# Patient Record
Sex: FEMALE | Race: WHITE | NOT HISPANIC OR LATINO | URBAN - METROPOLITAN AREA
[De-identification: names, ages, dates, MRNs, and addresses within clinical notes are randomized per-mention and may not be internally consistent; named-entity substitution may affect disease eponyms.]

---

## 2018-07-08 ENCOUNTER — EMERGENCY (EMERGENCY)
Facility: HOSPITAL | Age: 83
LOS: 0 days | Discharge: HOME | End: 2018-07-08
Attending: EMERGENCY MEDICINE
Payer: MEDICARE

## 2018-07-08 VITALS
HEART RATE: 68 BPM | DIASTOLIC BLOOD PRESSURE: 71 MMHG | OXYGEN SATURATION: 97 % | SYSTOLIC BLOOD PRESSURE: 126 MMHG | TEMPERATURE: 98 F | RESPIRATION RATE: 19 BRPM

## 2018-07-08 VITALS
HEART RATE: 66 BPM | OXYGEN SATURATION: 100 % | DIASTOLIC BLOOD PRESSURE: 109 MMHG | TEMPERATURE: 98 F | WEIGHT: 130.07 LBS | SYSTOLIC BLOOD PRESSURE: 187 MMHG | RESPIRATION RATE: 17 BRPM | HEIGHT: 65 IN

## 2018-07-08 DIAGNOSIS — M25.511 PAIN IN RIGHT SHOULDER: ICD-10-CM

## 2018-07-08 DIAGNOSIS — I10 ESSENTIAL (PRIMARY) HYPERTENSION: ICD-10-CM

## 2018-07-08 DIAGNOSIS — R51 HEADACHE: ICD-10-CM

## 2018-07-08 DIAGNOSIS — I48.91 UNSPECIFIED ATRIAL FIBRILLATION: ICD-10-CM

## 2018-07-08 DIAGNOSIS — M54.5 LOW BACK PAIN: ICD-10-CM
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
## 2018-07-08 DIAGNOSIS — R07.9 CHEST PAIN, UNSPECIFIED: ICD-10-CM
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
## 2018-07-08 LAB
ALBUMIN SERPL ELPH-MCNC: 2.9 G/DL — LOW (ref 3.3–5)
ALP SERPL-CCNC: 139 U/L — HIGH (ref 40–120)
ALT FLD-CCNC: 22 U/L — SIGNIFICANT CHANGE UP (ref 12–78)
ANION GAP SERPL CALC-SCNC: 9 MMOL/L — SIGNIFICANT CHANGE UP (ref 5–17)
APTT BLD: 26.4 SEC — LOW (ref 27.5–37.4)
AST SERPL-CCNC: 34 U/L — SIGNIFICANT CHANGE UP (ref 15–37)
BILIRUB SERPL-MCNC: 0.8 MG/DL — SIGNIFICANT CHANGE UP (ref 0.2–1.2)
BUN SERPL-MCNC: 26 MG/DL — HIGH (ref 7–23)
CALCIUM SERPL-MCNC: 8.7 MG/DL — SIGNIFICANT CHANGE UP (ref 8.5–10.1)
CHLORIDE SERPL-SCNC: 106 MMOL/L — SIGNIFICANT CHANGE UP (ref 96–108)
CO2 SERPL-SCNC: 26 MMOL/L — SIGNIFICANT CHANGE UP (ref 22–31)
CREAT SERPL-MCNC: 1.23 MG/DL — SIGNIFICANT CHANGE UP (ref 0.5–1.3)
GLUCOSE SERPL-MCNC: 114 MG/DL — HIGH (ref 70–99)
HCT VFR BLD CALC: 38.6 % — SIGNIFICANT CHANGE UP (ref 34.5–45)
HGB BLD-MCNC: 11.4 G/DL — LOW (ref 11.5–15.5)
INR BLD: 1.28 RATIO — HIGH (ref 0.88–1.16)
MCHC RBC-ENTMCNC: 23.5 PG — LOW (ref 27–34)
MCHC RBC-ENTMCNC: 29.5 GM/DL — LOW (ref 32–36)
MCV RBC AUTO: 79.6 FL — LOW (ref 80–100)
NRBC # BLD: 0 /100 WBCS — SIGNIFICANT CHANGE UP (ref 0–0)
PLATELET # BLD AUTO: 306 K/UL — SIGNIFICANT CHANGE UP (ref 150–400)
POTASSIUM SERPL-MCNC: 4.2 MMOL/L — SIGNIFICANT CHANGE UP (ref 3.5–5.3)
POTASSIUM SERPL-SCNC: 4.2 MMOL/L — SIGNIFICANT CHANGE UP (ref 3.5–5.3)
PROT SERPL-MCNC: 7.4 GM/DL — SIGNIFICANT CHANGE UP (ref 6–8.3)
PROTHROM AB SERPL-ACNC: 14 SEC — HIGH (ref 9.8–12.7)
RBC # BLD: 4.85 M/UL — SIGNIFICANT CHANGE UP (ref 3.8–5.2)
RBC # FLD: 16.9 % — HIGH (ref 10.3–14.5)
SODIUM SERPL-SCNC: 141 MMOL/L — SIGNIFICANT CHANGE UP (ref 135–145)
TROPONIN I SERPL-MCNC: 0.03 NG/ML — SIGNIFICANT CHANGE UP (ref 0.01–0.04)
WBC # BLD: 7.14 K/UL — SIGNIFICANT CHANGE UP (ref 3.8–10.5)
WBC # FLD AUTO: 7.14 K/UL — SIGNIFICANT CHANGE UP (ref 3.8–10.5)

## 2018-07-08 PROCEDURE — 71045 X-RAY EXAM CHEST 1 VIEW: CPT | Mod: 26

## 2018-07-08 PROCEDURE — 70450 CT HEAD/BRAIN W/O DYE: CPT | Mod: 26

## 2018-07-08 PROCEDURE — 99284 EMERGENCY DEPT VISIT MOD MDM: CPT

## 2018-07-08 PROCEDURE — 73030 X-RAY EXAM OF SHOULDER: CPT | Mod: 26,RT

## 2018-07-08 PROCEDURE — 93010 ELECTROCARDIOGRAM REPORT: CPT

## 2018-07-08 PROCEDURE — 72125 CT NECK SPINE W/O DYE: CPT | Mod: 26

## 2018-07-08 PROCEDURE — 72100 X-RAY EXAM L-S SPINE 2/3 VWS: CPT | Mod: 26

## 2018-07-08 PROCEDURE — 72170 X-RAY EXAM OF PELVIS: CPT | Mod: 26

## 2018-07-08 RX ORDER — ACETAMINOPHEN 500 MG
975 TABLET ORAL ONCE
Qty: 0 | Refills: 0 | Status: COMPLETED | OUTPATIENT
Start: 2018-07-08 | End: 2018-07-08

## 2018-07-08 RX ORDER — PROPRANOLOL HCL 160 MG
80 CAPSULE, EXTENDED RELEASE 24HR ORAL
Qty: 0 | Refills: 0 | COMMUNITY

## 2018-07-08 RX ORDER — PROPRANOLOL HCL 160 MG
0 CAPSULE, EXTENDED RELEASE 24HR ORAL
Qty: 0 | Refills: 0 | COMMUNITY

## 2018-07-08 RX ADMIN — Medication 975 MILLIGRAM(S): at 07:55

## 2018-07-08 RX ADMIN — Medication 0.2 MILLIGRAM(S): at 07:55

## 2018-07-08 NOTE — ED PROVIDER NOTE - PROGRESS NOTE DETAILS
Results reported to patient--grossly benign, CTs manuel, no evidence of acute trauma, labs WNL  Pt. reports feeling better after meds  pt. agrees to f/u with primary care outpt.  pt. understands to return to ED if symptoms worsen; will d/c

## 2018-07-08 NOTE — ED PROVIDER NOTE - PHYSICAL EXAMINATION
Vitals: HTN at 187/109, otherwise WNL  Gen: AAOx3, NAD, sitting comfortably in stretcher  Head: ncat, perrla, eomi b/l  Neck: supple, no lymphadenopathy, no midline deviation  Heart: rrr, no m/r/g  Lungs: CTA b/l, no rales/ronchi/wheezes  Abd: soft, nontender, non-distended, no rebound or guarding  Ext: no clubbing/cyanosis/edema  Neuro: sensation and muscle strength intact b/l, steady gait

## 2018-07-08 NOTE — ED PROVIDER NOTE - MEDICAL DECISION MAKING DETAILS
89 yo F with likely muscle strain, r/o fracture/brain bleed 2/2 advanced age  -tylenol for pain, CT brain/cervical, labs including coags, trop (shoulder pain--ACS equivalent), ekg, monitor, clonidine for pressure  -f/u results, reeval

## 2018-07-08 NOTE — ED PROVIDER NOTE - OBJECTIVE STATEMENT
89 yo F with headache, neck and back pain after unwitnessed fall.  Pt. was at home, was trying to use walker to ambulate, then tripped and fell down to the floor.  Pt. remembers the incident, but can't tell me for sure whether she hit her head or not.  She also has r shoulder pain, but she's moving the shoulder.  No other complaints.  Family member at beside her mental status was normal after the fall.  ROS: negative for fever, cough, headache, chest pain, shortness of breath, abd pain, nausea, vomiting, diarrhea, rash, paresthesia, and weakness--all other systems reviewed are negative.   PMH: HTN; Meds: See EMR; SH: Denies smoking/drinking/drug use 89 yo F with headache, neck and back pain after unwitnessed fall.  Pt. was at home, was trying to use walker to ambulate, then tripped and fell down to the floor.  Pt. remembers the incident, but can't tell me for sure whether she hit her head or not.  She also has r shoulder pain, but she's moving the shoulder.  No other complaints.  Family member at beside her mental status was normal after the fall.  ROS: negative for fever, cough, headache, chest pain, shortness of breath, abd pain, nausea, vomiting, diarrhea, rash, paresthesia, and weakness--all other systems reviewed are negative.   PMH: HTN, a-fib; Meds: See EMR; SH: Denies smoking/drinking/drug use

## 2018-07-08 NOTE — ED ADULT NURSE NOTE - OBJECTIVE STATEMENT
(2) probably inadequate patient received a/o x3 with c/o while making eggs in her kitchen this morning, she turned around and tripped over her Rolator accidentally. C/o pain to right shoulder, back and head pain. No skin breaks or swelling noted.

## 2018-07-21 ENCOUNTER — INPATIENT (INPATIENT)
Facility: HOSPITAL | Age: 83
LOS: 1 days | Discharge: HOSPICE | End: 2018-07-23
Attending: INTERNAL MEDICINE | Admitting: INTERNAL MEDICINE
Payer: MEDICARE

## 2018-07-21 VITALS
RESPIRATION RATE: 13 BRPM | HEIGHT: 64 IN | HEART RATE: 78 BPM | SYSTOLIC BLOOD PRESSURE: 195 MMHG | DIASTOLIC BLOOD PRESSURE: 115 MMHG | WEIGHT: 179.9 LBS | OXYGEN SATURATION: 99 % | TEMPERATURE: 98 F

## 2018-07-21 DIAGNOSIS — E78.00 PURE HYPERCHOLESTEROLEMIA, UNSPECIFIED: ICD-10-CM

## 2018-07-21 DIAGNOSIS — I63.9 CEREBRAL INFARCTION, UNSPECIFIED: ICD-10-CM

## 2018-07-21 DIAGNOSIS — I10 ESSENTIAL (PRIMARY) HYPERTENSION: ICD-10-CM

## 2018-07-21 LAB
ALBUMIN SERPL ELPH-MCNC: 2.7 G/DL — LOW (ref 3.3–5)
ALP SERPL-CCNC: 173 U/L — HIGH (ref 40–120)
ALT FLD-CCNC: 25 U/L — SIGNIFICANT CHANGE UP (ref 12–78)
ANION GAP SERPL CALC-SCNC: 14 MMOL/L — SIGNIFICANT CHANGE UP (ref 5–17)
APPEARANCE UR: ABNORMAL
APTT BLD: 26.8 SEC — LOW (ref 27.5–37.4)
AST SERPL-CCNC: 36 U/L — SIGNIFICANT CHANGE UP (ref 15–37)
BASOPHILS # BLD AUTO: 0.04 K/UL — SIGNIFICANT CHANGE UP (ref 0–0.2)
BASOPHILS NFR BLD AUTO: 0.4 % — SIGNIFICANT CHANGE UP (ref 0–2)
BILIRUB SERPL-MCNC: 0.9 MG/DL — SIGNIFICANT CHANGE UP (ref 0.2–1.2)
BILIRUB UR-MCNC: NEGATIVE — SIGNIFICANT CHANGE UP
BUN SERPL-MCNC: 29 MG/DL — HIGH (ref 7–23)
CALCIUM SERPL-MCNC: 8.8 MG/DL — SIGNIFICANT CHANGE UP (ref 8.5–10.1)
CHLORIDE SERPL-SCNC: 107 MMOL/L — SIGNIFICANT CHANGE UP (ref 96–108)
CK MB CFR SERPL CALC: 3 NG/ML — SIGNIFICANT CHANGE UP (ref 0.5–3.6)
CO2 SERPL-SCNC: 19 MMOL/L — LOW (ref 22–31)
COLOR SPEC: YELLOW — SIGNIFICANT CHANGE UP
CREAT SERPL-MCNC: 1.12 MG/DL — SIGNIFICANT CHANGE UP (ref 0.5–1.3)
DIFF PNL FLD: ABNORMAL
EOSINOPHIL # BLD AUTO: 0.02 K/UL — SIGNIFICANT CHANGE UP (ref 0–0.5)
EOSINOPHIL NFR BLD AUTO: 0.2 % — SIGNIFICANT CHANGE UP (ref 0–6)
GLUCOSE BLDC GLUCOMTR-MCNC: 112 MG/DL — HIGH (ref 70–99)
GLUCOSE SERPL-MCNC: 109 MG/DL — HIGH (ref 70–99)
GLUCOSE UR QL: NEGATIVE MG/DL — SIGNIFICANT CHANGE UP
HCT VFR BLD CALC: 39.6 % — SIGNIFICANT CHANGE UP (ref 34.5–45)
HGB BLD-MCNC: 11.8 G/DL — SIGNIFICANT CHANGE UP (ref 11.5–15.5)
IMM GRANULOCYTES NFR BLD AUTO: 0.4 % — SIGNIFICANT CHANGE UP (ref 0–1.5)
INR BLD: 1.28 RATIO — HIGH (ref 0.88–1.16)
KETONES UR-MCNC: NEGATIVE — SIGNIFICANT CHANGE UP
LACTATE SERPL-SCNC: 1.8 MMOL/L — SIGNIFICANT CHANGE UP (ref 0.7–2)
LEUKOCYTE ESTERASE UR-ACNC: ABNORMAL
LYMPHOCYTES # BLD AUTO: 1.26 K/UL — SIGNIFICANT CHANGE UP (ref 1–3.3)
LYMPHOCYTES # BLD AUTO: 11.7 % — LOW (ref 13–44)
MCHC RBC-ENTMCNC: 22.8 PG — LOW (ref 27–34)
MCHC RBC-ENTMCNC: 29.8 GM/DL — LOW (ref 32–36)
MCV RBC AUTO: 76.6 FL — LOW (ref 80–100)
MONOCYTES # BLD AUTO: 1.08 K/UL — HIGH (ref 0–0.9)
MONOCYTES NFR BLD AUTO: 10 % — SIGNIFICANT CHANGE UP (ref 2–14)
NEUTROPHILS # BLD AUTO: 8.34 K/UL — HIGH (ref 1.8–7.4)
NEUTROPHILS NFR BLD AUTO: 77.3 % — HIGH (ref 43–77)
NITRITE UR-MCNC: POSITIVE
PH UR: 5 — SIGNIFICANT CHANGE UP (ref 5–8)
PLATELET # BLD AUTO: 304 K/UL — SIGNIFICANT CHANGE UP (ref 150–400)
POTASSIUM SERPL-MCNC: 4.1 MMOL/L — SIGNIFICANT CHANGE UP (ref 3.5–5.3)
POTASSIUM SERPL-SCNC: 4.1 MMOL/L — SIGNIFICANT CHANGE UP (ref 3.5–5.3)
PROT SERPL-MCNC: 7.3 GM/DL — SIGNIFICANT CHANGE UP (ref 6–8.3)
PROT UR-MCNC: 100 MG/DL
PROTHROM AB SERPL-ACNC: 14 SEC — HIGH (ref 9.8–12.7)
RBC # BLD: 5.17 M/UL — SIGNIFICANT CHANGE UP (ref 3.8–5.2)
RBC # FLD: 18.6 % — HIGH (ref 10.3–14.5)
SODIUM SERPL-SCNC: 140 MMOL/L — SIGNIFICANT CHANGE UP (ref 135–145)
SP GR SPEC: 1.01 — SIGNIFICANT CHANGE UP (ref 1.01–1.02)
TROPONIN I SERPL-MCNC: 1.36 NG/ML — HIGH (ref 0.01–0.04)
UROBILINOGEN FLD QL: NEGATIVE MG/DL — SIGNIFICANT CHANGE UP
WBC # BLD: 10.78 K/UL — HIGH (ref 3.8–10.5)
WBC # FLD AUTO: 10.78 K/UL — HIGH (ref 3.8–10.5)

## 2018-07-21 PROCEDURE — 93010 ELECTROCARDIOGRAM REPORT: CPT

## 2018-07-21 PROCEDURE — 99291 CRITICAL CARE FIRST HOUR: CPT

## 2018-07-21 PROCEDURE — 70450 CT HEAD/BRAIN W/O DYE: CPT | Mod: 26

## 2018-07-21 PROCEDURE — 71045 X-RAY EXAM CHEST 1 VIEW: CPT | Mod: 26

## 2018-07-21 RX ORDER — VANCOMYCIN HCL 1 G
1000 VIAL (EA) INTRAVENOUS ONCE
Qty: 0 | Refills: 0 | Status: DISCONTINUED | OUTPATIENT
Start: 2018-07-21 | End: 2018-07-21

## 2018-07-21 RX ORDER — PROPRANOLOL HCL 160 MG
80 CAPSULE, EXTENDED RELEASE 24HR ORAL
Qty: 0 | Refills: 0 | COMMUNITY

## 2018-07-21 RX ORDER — NYSTATIN CREAM 100000 [USP'U]/G
1 CREAM TOPICAL
Qty: 0 | Refills: 0 | Status: DISCONTINUED | OUTPATIENT
Start: 2018-07-21 | End: 2018-07-23

## 2018-07-21 RX ORDER — LABETALOL HCL 100 MG
10 TABLET ORAL ONCE
Qty: 0 | Refills: 0 | Status: COMPLETED | OUTPATIENT
Start: 2018-07-21 | End: 2018-07-21

## 2018-07-21 RX ORDER — ROSUVASTATIN CALCIUM 5 MG/1
1 TABLET ORAL
Qty: 0 | Refills: 0 | COMMUNITY

## 2018-07-21 RX ORDER — SODIUM CHLORIDE 9 MG/ML
1000 INJECTION, SOLUTION INTRAVENOUS
Qty: 0 | Refills: 0 | Status: DISCONTINUED | OUTPATIENT
Start: 2018-07-21 | End: 2018-07-23

## 2018-07-21 RX ORDER — ENOXAPARIN SODIUM 100 MG/ML
40 INJECTION SUBCUTANEOUS DAILY
Qty: 0 | Refills: 0 | Status: DISCONTINUED | OUTPATIENT
Start: 2018-07-21 | End: 2018-07-23

## 2018-07-21 RX ORDER — PIPERACILLIN AND TAZOBACTAM 4; .5 G/20ML; G/20ML
3.38 INJECTION, POWDER, LYOPHILIZED, FOR SOLUTION INTRAVENOUS ONCE
Qty: 0 | Refills: 0 | Status: COMPLETED | OUTPATIENT
Start: 2018-07-21 | End: 2018-07-21

## 2018-07-21 RX ORDER — MORPHINE SULFATE 50 MG/1
2 CAPSULE, EXTENDED RELEASE ORAL EVERY 6 HOURS
Qty: 0 | Refills: 0 | Status: DISCONTINUED | OUTPATIENT
Start: 2018-07-21 | End: 2018-07-23

## 2018-07-21 RX ORDER — ASPIRIN/CALCIUM CARB/MAGNESIUM 324 MG
1 TABLET ORAL
Qty: 0 | Refills: 0 | COMMUNITY

## 2018-07-21 RX ORDER — PIPERACILLIN AND TAZOBACTAM 4; .5 G/20ML; G/20ML
4.5 INJECTION, POWDER, LYOPHILIZED, FOR SOLUTION INTRAVENOUS ONCE
Qty: 0 | Refills: 0 | Status: DISCONTINUED | OUTPATIENT
Start: 2018-07-21 | End: 2018-07-21

## 2018-07-21 RX ORDER — FUROSEMIDE 40 MG
0 TABLET ORAL
Qty: 0 | Refills: 0 | COMMUNITY

## 2018-07-21 RX ADMIN — Medication 1 PATCH: at 17:04

## 2018-07-21 RX ADMIN — PIPERACILLIN AND TAZOBACTAM 200 GRAM(S): 4; .5 INJECTION, POWDER, LYOPHILIZED, FOR SOLUTION INTRAVENOUS at 10:43

## 2018-07-21 RX ADMIN — SODIUM CHLORIDE 75 MILLILITER(S): 9 INJECTION, SOLUTION INTRAVENOUS at 17:05

## 2018-07-21 RX ADMIN — ENOXAPARIN SODIUM 40 MILLIGRAM(S): 100 INJECTION SUBCUTANEOUS at 17:05

## 2018-07-21 RX ADMIN — Medication 10 MILLIGRAM(S): at 10:21

## 2018-07-21 NOTE — H&P ADULT - NSHPLABSRESULTS_GEN_ALL_CORE
LABS:                        11.8   10.78 )-----------( 304      ( 2018 09:43 )             39.6     -    140  |  107  |  29<H>  ----------------------------<  109<H>  4.1   |  19<L>  |  1.12    Ca    8.8      2018 09:43    TPro  7.3  /  Alb  2.7<L>  /  TBili  0.9  /  DBili  x   /  AST  36  /  ALT  25  /  AlkPhos  173<H>  07    PT/INR - ( 2018 09:43 )   PT: 14.0 sec;   INR: 1.28 ratio         PTT - ( 2018 09:43 )  PTT:26.8 sec  Urinalysis Basic - ( 2018 10:21 )    Color: Yellow / Appearance: Slightly Turbid / S.015 / pH: x  Gluc: x / Ketone: Negative  / Bili: Negative / Urobili: Negative mg/dL   Blood: x / Protein: 100 mg/dL / Nitrite: Positive   Leuk Esterase: Small / RBC: 6-10 /HPF / WBC 11-25   Sq Epi: x / Non Sq Epi: Moderate / Bacteria: Many

## 2018-07-21 NOTE — H&P ADULT - HISTORY OF PRESENT ILLNESS
reported  unresponsive  at  home  evaluated  in ER  found t o  have  large L CVA  admitted  for  palliative  care  no  feeding  tubes  patient  DNR  DNI

## 2018-07-21 NOTE — ED ADULT NURSE REASSESSMENT NOTE - GENERAL PATIENT STATE
skin with fungus in josh area and under breast
right side not moving, unresponsive to verbal/resting/sleeping/no change observed

## 2018-07-21 NOTE — PROGRESS NOTE ADULT - SUBJECTIVE AND OBJECTIVE BOX
HPI:  reported  unresponsive  at  home  evaluated  in ER  found t o  have  large L CVA  admitted  for  palliative  care  no  feeding  tubes  patient  DNR  DNI (21 Jul 2018 14:40)  PAST MEDICAL & SURGICAL HISTORY:  High cholesterol  HTN (hypertension)  HPI:        SYMPTOMS---	                   DIDILITY    OTHER REVIEW OF SYSTEMS:  UNABLE TO OBTAIN  due to: SEDATION, CONFUSION    Baseline ADLs (prior to admission):  Independent [ ] moderately [ ] fully   Dependent   [ ] moderately [ ]fully    	                 T(C): 37.8 (07-21-18 @ 10:22), Max: 37.8 (07-21-18 @ 10:22)  T(F): 100.1 (07-21-18 @ 10:22), Max: 100.1 (07-21-18 @ 10:22)  HR: 74 (07-21-18 @ 11:16) (74 - 78)  BP: 170/85 (07-21-18 @ 11:16) (170/85 - 195/115)  RR: 27 (07-21-18 @ 11:16) (13 - 27)  SpO2: 96% (07-21-18 @ 11:16) (96% - 99%)  Wt(kg): --      GENERAL:    EYES : non icteric :               Other:     HEENT:      	atraumatic, normocephalic, PEERLA, sclera anicteric, dry oral mucosa   NECK:          Trach:        Vent:  CVS:          Tachypnic:               Bradycardic:              Normal:		   RESP:        tachypnic:                Dyspenic :                  Comfortable:	  GI:          NGT/PEG 	  :      moncada      	  MUSC:       	Normal	Weakness	  Edema	   NEURO:     	No focal deficit	  Focal  PSYCH:           Alert	Oriented	  Lethargic     SKIN:         	Normal	  Other  LYMPH:      	Normal	  Other  	                     ALLERGIES: No Known Allergies    MEDICATIONS  (STANDING):  cloNIDine Patch 0.1 mG/24Hr(s) 1 patch Topical every 7 days  dextrose 5% + sodium chloride 0.9%. 1000 milliLiter(s) (75 mL/Hr) IV Continuous <Continuous>  enoxaparin Injectable 40 milliGRAM(s) SubCutaneous daily    MEDICATIONS  (PRN):  morphine  - Injectable 2 milliGRAM(s) IV Push every 6 hours PRN Moderate Pain (4 - 6)    	                   LABS REVIEWED    H/H: 11.8/39.6   07-21 @ 09:43    BUN/CR: 29/1.12  07-21 @ 09:43    Albumin: 2.7  07-21 @ 09:43    CRP/SED RATE: --/-- 07-21 @ 09:43    Sodium: 140  07-21 @ 09:43                  	  ADVANCED DIRECTIVES:   FULL CODE [   ]  DNR [x  ]    DNI [ x ]     MOLST [ x ]  PSYCHOSOCIAL-SPIRITUAL ASSESSMENT:       Reviewed       GOALS OF CARE DISCUSSION       Palliative care info/counseling provided	           Family meeting       Advanced Directives addressed	           See previous Palliative Medicine Note  	        REFERRALS	        Palliative Med        Unit SW/Case Mgmt              Speech/Swallow        Hospice             Nutrition         Functional Assessment: KPS:   <20            PPS: poor      FINAL IMPRESSION AND RECOMMENDATIONS: overall poor prognosis poor   sx controlled   cont current care

## 2018-07-21 NOTE — ED PROVIDER NOTE - MEDICAL DECISION MAKING DETAILS
unresponsivenessness, hypertensive last seen normal >7 hours ago, possible right sided deficits, concern for stroke/ich. per daughter/son, pt would have liked to have been dnr/dni, no aggressive care. ct head, labs, urine, cxr, unresponsiveness, hypertensive, afib not on a/c, NIHSS 17. last seen normal >7 hours ago, possible right sided deficits, concern for ich, less likely stroke, outside window of tpa even if no bleed, and given age and deficits with lack of desire for aggressive care, no tpa regardless. per daughter/son, pt would have liked to have been dnr/dni, no aggressive care. verbal dnr/dni at this time. ct head, labs, urine, cxr,

## 2018-07-21 NOTE — ED PROVIDER NOTE - CRITICAL CARE PROVIDED
additional history taking/direct patient care (not related to procedure)/documentation/conducted a detailed discussion of DNR status/interpretation of diagnostic studies

## 2018-07-21 NOTE — H&P ADULT - NSHPPHYSICALEXAM_GEN_ALL_CORE
PHYSICAL EXAM:    GENERAL: NAD,  HEAD:  Atraumatic, Normocephalic  EYES: EOMI, PERRLA, conjunctiva and sclera clear    NECK: Supple, No JVD, Normal thyroid  NERVOUS SYSTEM:  somnolent  poorly  arouseable  moves  rt  side L  sided  hemiparesis  CHEST/LUNG: Clear  bilaterally; No rales, rhonchi, wheezing, or rubs  HEART: Regular rate and rhythm; No murmurs, rubs, or gallops  ABDOMEN: Soft, Nontender, Nondistended; no  masses Bowel sounds present  EXTREMITIES:  + Peripheral Pulses, No clubbing, cyanosis, or edema  LYMPH: No lymphadenopathy noted   RECTAL: deferred

## 2018-07-21 NOTE — CHART NOTE - NSCHARTNOTEFT_GEN_A_CORE
RN called for hypertension regimen -- Clonidine patch given 20 min ago.  Told to monitor and if persistent to call back for another regimen

## 2018-07-21 NOTE — ED PROVIDER NOTE - OBJECTIVE STATEMENT
89 y/o female hx htn, hld, chf, very limited mobility biba for minimal responsiveness. Per daughter Oksana (bedside) and ems, pt ate food 2am, was at her baseline, seen this morning ~9am, not responding, slightly slumped to right, protecting airway-gagged with attempted opa. No hx afib known, no a/c, no strokes in past. Behaving normally yesterday Per daughter Oksana and son Homar, pt was going to sign dnr/dni today when visited Dr. Ryder, but hasn't signed it yet, would not want aggressive care.     no ros given mental status.

## 2018-07-21 NOTE — ED PROVIDER NOTE - PROGRESS NOTE DETAILS
James: pt with mca infarct, likely uti, daughter Oksana, and son Homar both bedside, long discussion with pt about pt's wishes and level of care, both are in agreement pt would not want any aggressive care, would like dnr/dni, states pt often stated would not want to live like that. family would not like clot retrieval/localized tpa even if it were indicated based upon these wishes after explaining treatment options to family. Homar signed dnr/dni molst form with Oksana present. pt to be admitted to Dr. Ryder with palliative consult. James: Dr. grajeda aware, Dr. casper paged before, no answer, to be consulted though no intervention. being treated for uti as well.

## 2018-07-21 NOTE — ED PROVIDER NOTE - PHYSICAL EXAMINATION
Gen: responds to painful stimuli, eyes closed, not following commands.   HEENT: dry mucus membranes. PERRLA, No eye deviation  CV: somewhat irregular, normal rate  Resp: CTAB, normal rate and effort  GI: Abdomen soft, NT, ND. No rebound, no guarding  : No CVAT  Neuro: moving LUE more than right to noxious stimuli, but localizes pain of RUE, moves both LE equally.   MSK: No spine or joint tenderness to palpation  Skin: No rashes. intact and perfused. NIHSS: 17    Gen: responds to painful stimuli, eyes closed, not following commands.   HEENT: dry mucus membranes. PERRLA, No eye deviation  CV: somewhat irregular, normal rate  Resp: CTAB, normal rate and effort  GI: Abdomen soft, NT, ND. No rebound, no guarding  : No CVAT  Neuro: moving LUE more than right to noxious stimuli, but localizes pain of RUE, moves both LE equally. not folllowing commands or speaking. no posturing  MSK: No spine or joint tenderness to palpation  Skin: No rashes. intact and perfused.

## 2018-07-22 RX ORDER — ACETAMINOPHEN 500 MG
325 TABLET ORAL
Qty: 0 | Refills: 0 | Status: DISCONTINUED | OUTPATIENT
Start: 2018-07-22 | End: 2018-07-23

## 2018-07-22 RX ADMIN — SODIUM CHLORIDE 75 MILLILITER(S): 9 INJECTION, SOLUTION INTRAVENOUS at 12:39

## 2018-07-22 RX ADMIN — NYSTATIN CREAM 1 APPLICATION(S): 100000 CREAM TOPICAL at 06:40

## 2018-07-22 RX ADMIN — NYSTATIN CREAM 1 APPLICATION(S): 100000 CREAM TOPICAL at 18:17

## 2018-07-22 RX ADMIN — ENOXAPARIN SODIUM 40 MILLIGRAM(S): 100 INJECTION SUBCUTANEOUS at 12:39

## 2018-07-23 VITALS
DIASTOLIC BLOOD PRESSURE: 109 MMHG | TEMPERATURE: 98 F | RESPIRATION RATE: 18 BRPM | HEART RATE: 87 BPM | SYSTOLIC BLOOD PRESSURE: 164 MMHG | OXYGEN SATURATION: 91 %

## 2018-07-23 PROCEDURE — 99497 ADVNCD CARE PLAN 30 MIN: CPT

## 2018-07-23 RX ORDER — MORPHINE SULFATE 50 MG/1
2 CAPSULE, EXTENDED RELEASE ORAL
Qty: 0 | Refills: 0 | COMMUNITY
Start: 2018-07-23

## 2018-07-23 RX ORDER — ACETAMINOPHEN 500 MG
1 TABLET ORAL
Qty: 0 | Refills: 0 | COMMUNITY
Start: 2018-07-23

## 2018-07-23 RX ORDER — SODIUM CHLORIDE 9 MG/ML
1000 INJECTION, SOLUTION INTRAVENOUS
Qty: 0 | Refills: 0 | COMMUNITY
Start: 2018-07-23

## 2018-07-23 RX ADMIN — NYSTATIN CREAM 1 APPLICATION(S): 100000 CREAM TOPICAL at 05:40

## 2018-07-23 RX ADMIN — ENOXAPARIN SODIUM 40 MILLIGRAM(S): 100 INJECTION SUBCUTANEOUS at 12:12

## 2018-07-23 RX ADMIN — NYSTATIN CREAM 1 APPLICATION(S): 100000 CREAM TOPICAL at 17:39

## 2018-07-23 RX ADMIN — SODIUM CHLORIDE 75 MILLILITER(S): 9 INJECTION, SOLUTION INTRAVENOUS at 10:01

## 2018-07-23 NOTE — DISCHARGE NOTE ADULT - PATIENT PORTAL LINK FT
You can access the [facility] Patient Portal, offered by [facility], by registering with the following website: [email or web address]

## 2018-07-23 NOTE — DISCHARGE NOTE ADULT - HOSPITAL COURSE
reported  unresponsive  at  home  evaluated  in ER  found t o  have  large L CVA  admitted  for  palliative  care  no  feeding  tubes  patient  DNR  DNI.  Pt seen by palliative care and Gracia, hospice RN. Pt for admit to inpatient hospice.

## 2018-07-23 NOTE — GOALS OF CARE CONVERSATION - PERSONAL ADVANCE DIRECTIVE - CONVERSATION DETAILS
Referral received. Pt approved for In pt HSP services at [facility]. Spoke with pt's son Homar. He will meet me at [facility] facility at 3 pm to tour the hospice unit and sign hospice consents. Will cont to f/u.       Gracia Daigle RN

## 2018-07-23 NOTE — PROGRESS NOTE ADULT - SUBJECTIVE AND OBJECTIVE BOX
INTERVAL HPI/OVERNIGHT EVENTS:        REVIEW OF SYSTEMS:  CONSTITUTIONAL:  unresponsive    MEDICATION:  acetaminophen  Suppository. 325 milliGRAM(s) Rectal four times a day PRN  cloNIDine Patch 0.1 mG/24Hr(s) 1 patch Topical every 7 days  dextrose 5% + sodium chloride 0.9%. 1000 milliLiter(s) IV Continuous <Continuous>  enoxaparin Injectable 40 milliGRAM(s) SubCutaneous daily  morphine  - Injectable 2 milliGRAM(s) IV Push every 6 hours PRN  nystatin Ointment 1 Application(s) Topical two times a day    Vital Signs Last 24 Hrs  T(C): 36.9 (2018 05:19), Max: 37.7 (2018 18:15)  T(F): 98.4 (2018 05:19), Max: 99.9 (2018 18:15)  HR: 90 (2018 05:19) (89 - 96)  BP: 159/80 (2018 05:19) (141/69 - 176/104)  BP(mean): --  RR: 18 (2018 05:19) (18 - 22)  SpO2: 93% (2018 05:19) (92% - 98%)    PHYSICAL EXAM:  GENERAL: NAD,   EYES:  conjunctiva and sclera clear  NECK: Supple, No JVD,   NERVOUS SYSTEM:  comatose  moves  all  extr  with  nociferous  stimulus  CHEST/LUNG: Clear    HEART: Regular rate and rhythm; No murmurs, rubs, or gallops  ABDOMEN: Soft, Nontender, Nondistended; Bowel sounds present  EXTREMITIES:  no  edema no  tenderness  SKIN: No rashes   LABS:                        11.8   10.78 )-----------( 304      ( 2018 09:43 )             39.6     07-    140  |  107  |  29<H>  ----------------------------<  109<H>  4.1   |  19<L>  |  1.12    Ca    8.8      2018 09:43    TPro  7.3  /  Alb  2.7<L>  /  TBili  0.9  /  DBili  x   /  AST  36  /  ALT  25  /  AlkPhos  173<H>  07-21    PT/INR - ( 2018 09:43 )   PT: 14.0 sec;   INR: 1.28 ratio         PTT - ( 2018 09:43 )  PTT:26.8 sec  Urinalysis Basic - ( 2018 10:21 )    Color: Yellow / Appearance: Slightly Turbid / S.015 / pH: x  Gluc: x / Ketone: Negative  / Bili: Negative / Urobili: Negative mg/dL   Blood: x / Protein: 100 mg/dL / Nitrite: Positive   Leuk Esterase: Small / RBC: 6-10 /HPF / WBC 11-25   Sq Epi: x / Non Sq Epi: Moderate / Bacteria: Many      CAPILLARY BLOOD GLUCOSE          RADIOLOGY & ADDITIONAL TESTS:    Imaging reports  Personally Reviewed:  [ x] YES  [ ] NO    Consultant(s) Notes Reviewed:  [ x] YES  [ ] NO    Care Discussed with Consultants/Other Providers [x ] YES  [ ] NO  Assessment and Plan:   Problem/Plan - 1:  ·  Problem: CVA (cerebral vascular accident).  Plan: ivf  morphine  dvt  prophylaxis  hospice  eval. DNR  DNI     Problem/Plan - 2:  ·  Problem: HTN (hypertension).  Plan: clonidine patch.     Problem/Plan - 3:  ·  Problem: High cholesterol.  Plan: observation.

## 2018-07-23 NOTE — CONSULT NOTE ADULT - SUBJECTIVE AND OBJECTIVE BOX
HPI:  reported  unresponsive  at  home  evaluated  in ER  found t o  have  large L CVA  admitted  for  palliative  care  no  feeding  tubes  patient  DNR  DNI (21 Jul 2018 14:40)      PERTINENT PMH REVIEWED:  [ ] YES [ ] NO           SOCIAL HISTORY:  Significant other/partner:  [ ] YES  [ ] NO            Children:  [ ] YES  [ ] NO                   Yazidism/Spirituality:  Substance hx:  [ ] YES   [ ] NO           Tobacco hx:  [ ] YES  [ ] NO             Alcohol hx: [ ] YES  [ ] NO        Home Opioid hx:  [ ] YES  [ ] NO   Living Situation: [ ] Home  [ ] Long term care  [ ] Rehab    FAMILY HISTORY:    [ ] Family history non contributory     BASELINE ADLs (prior to admission):  Independent [ ] moderately [ ] fully   Dependent   [ ] moderately [ ] fully    Code Status:                      MOLST  [ ] YES [ ] NO    Living Will  [ ] YES [ ] NO    Health Care Proxy [ ] YES  [ ] NO      [ ] Surrogate  [ ] HCP  [ ] Guardian:                                                                  Phone#:    Allergies    No Known Allergies    Intolerances        MEDICATIONS  (STANDING):  cloNIDine Patch 0.1 mG/24Hr(s) 1 patch Topical every 7 days  dextrose 5% + sodium chloride 0.9%. 1000 milliLiter(s) (75 mL/Hr) IV Continuous <Continuous>  enoxaparin Injectable 40 milliGRAM(s) SubCutaneous daily  nystatin Ointment 1 Application(s) Topical two times a day    MEDICATIONS  (PRN):  acetaminophen  Suppository. 325 milliGRAM(s) Rectal four times a day PRN Mild Pain (1 - 3)  morphine  - Injectable 2 milliGRAM(s) IV Push every 6 hours PRN Moderate Pain (4 - 6)      PRESENT SYMPTOMS:  Source: [ ] Patient   [ ] Family   [ ] Team     Pain: [ ] YES [ ] NO  Onset:                    Location:                          Duration:                 Character:            Aggravating factors:                        Relieving factors:    Radiation:              Timing:                             Severity:      Dyspnea: [ ] YES [ ] NO - Mild [ ]  Moderate [ ]  Severe [ ]    Anxiety: [ ] YES [ ] NO  Fatigue: [ ] YES [ ] NO   Nausea: [ ] YES [ ] NO  Loss of appetite: [ ] YES [ ] NO   Constipation: [ ] YES [ ] NO     Other Symptoms:  [ ] All other review of systems negative   [ ] Unable to obtain due to poor mentation     Does patient meet criteria for Severe Protein Calorie Malnutrition?  Yes [ ]  No [ ]  PPSV 30% or below [ ]  Anasarca [ ]  Albumin < 2 [ ] Catabolic State [ ] Poor nutritional intake [ ] Significant weight loss [ ]      Palliative Performance Status Version 2:         %  ECOG -        Vital Signs Last 24 Hrs  T(C): 36.9 (23 Jul 2018 05:19), Max: 37.7 (22 Jul 2018 18:15)  T(F): 98.4 (23 Jul 2018 05:19), Max: 99.9 (22 Jul 2018 18:15)  HR: 90 (23 Jul 2018 05:19) (89 - 96)  BP: 159/80 (23 Jul 2018 05:19) (141/69 - 176/104)  BP(mean): --  RR: 18 (23 Jul 2018 05:19) (18 - 22)  SpO2: 93% (23 Jul 2018 05:19) (92% - 98%)    Physical Exam:    General: [ ] Alert,  A&O x     [ ] lethargic   [ ] Agitated   [ ] Cachexia   HEENT: [ ] Normal   [ ] Dry mouth   [ ] ET Tube    [ ] Trach   Lungs: [ ] Clear [ ] Rhonchi  [ ] Crackles [ ] Wheezing [ ] Tachypnea  [ ] Audible excessive secretions    Cardiovascular:  [ ] Regular rate and rhythm  [ ] Irregular [ ] Tachycardia   [ ] Bradycardia   Abdomen: [ ] Soft  [ ] Distended  [ ] +BS  [ ] Non tender [ ] Tender  [ ]PEG   [ ] NGT   Last BM:     Genitourinary: [ ] Normal [ ] Incontinent   [ ] Oliguria/Anuria   [ ] Ford  Musculoskeletal:  [ ] Normal   [ ] Generalized weakness  [ ] Bedbound  [ ] Edema   Neurological: [ ] No focal deficits  [ ] Cognitive impairment     Skin: [ ] Normal   [ ] Pressure ulcers     LABS:              I&O's Summary    22 Jul 2018 07:01  -  23 Jul 2018 07:00  --------------------------------------------------------  IN: 350 mL / OUT: 1100 mL / NET: -750 mL        RADIOLOGY & ADDITIONAL STUDIES:    Family meeting on: DATE: Met and  spoke to                 and discussed goals of care and advance care planning.                                          Educated on MOLST Form and completed, DNR/DNI, Comfort measures only.                                          Hospice care explained and accepted.                                          Hospice evaluation called in, and Social work made aware to send in all script for Hospice evaluation  RECOMENDATIONS: DNR/ DNI, COMFORT CARE.                                       PAIN AND SYMPTOM MANAGEMENT WITH HOSPICE CARE.  Referrals:  Hospice [ ]   Chaplaincy [ ]    Child Life [ ]   Social Work [ ]   Case Management [ ]   Holistic Therapy [ ] HPI:  reported  unresponsive  at  home  evaluated  in ER  found t o  have  large L CVA  admitted  for  palliative  care  no  feeding  tubes  patient  DNR  DNI.    PERTINENT PMH REVIEWED:  [x ] YES [ ] NO           SOCIAL HISTORY:  Significant other/partner:  [ ] YES  [ ] NO            Children:  [ x] YES  [ ] NO                   Yarsanism/Spirituality:  Substance hx:  [ ] YES   [ ] NO           Tobacco hx:  [ ] YES  [ ] NO             Alcohol hx: [ ] YES  [ ] NO        Home Opioid hx:  [ ] YES  [ ] NO   Living Situation: [x ] Home  [ ] Long term care  [ ] Rehab    FAMILY HISTORY:    [x ] Family history non contributory     BASELINE ADLs (prior to admission):  Independent [ ] moderately [ ] fully   Dependent   [ ] moderately [ x] fully    Code Status:                      MOLST  [x ] YES [ ] NO 7/21/18   Living Will  [ ] YES [ ] NO    Health Care Proxy [x ] YES  [ ] NO      [ ] Surrogate  [ x] HCP  [ ] Guardian: Homar Onofre (son) [phone]   /Oksana Kessler (daughter) [phone]                                                              Phone#:    Allergies  No Known Allergies    Intolerances    MEDICATIONS  (STANDING):  cloNIDine Patch 0.1 mG/24Hr(s) 1 patch Topical every 7 days  dextrose 5% + sodium chloride 0.9%. 1000 milliLiter(s) (75 mL/Hr) IV Continuous <Continuous>  enoxaparin Injectable 40 milliGRAM(s) SubCutaneous daily  nystatin Ointment 1 Application(s) Topical two times a day    MEDICATIONS  (PRN):  acetaminophen  Suppository. 325 milliGRAM(s) Rectal four times a day PRN Mild Pain (1 - 3)  morphine  - Injectable 2 milliGRAM(s) IV Push every 6 hours PRN Moderate Pain (4 - 6)    PRESENT SYMPTOMS:  Source: [ ] Patient   [x ] Family   [ ] Team     Pain: [ x] YES [ ] NO  Onset:                    Location: Generalized      Duration: continoues      Character:            Aggravating factors:  movement                      Relieving factors: positioning.    Radiation:              Timing:                             Severity:      Dyspnea: [x ] YES [ ] NO - Mild [ ]  Moderate [x ]  Severe [ ]    Anxiety: [ ] YES [x ] NO  Fatigue: [ x] YES [ ] NO   Nausea: [ ] YES [x ] NO  Loss of appetite: [x ] YES [ ] NO   Constipation: [ ] YES [ ] NO     Other Symptoms:  [ ] All other review of systems negative   [x ] Unable to obtain due to poor mentation     Does patient meet criteria for Severe Protein Calorie Malnutrition?  Yes [x ]  No [ ]  PPSV 30% or below [ x]  Anasarca [ ]  Albumin < 2 [ ] Catabolic State [ ] Poor nutritional intake [x ] Significant weight loss [ ]      Palliative Performance Status Version 2:  20 %  ECOG - 4     Vital Signs Last 24 Hrs  T(C): 36.9 (23 Jul 2018 05:19), Max: 37.7 (22 Jul 2018 18:15)  T(F): 98.4 (23 Jul 2018 05:19), Max: 99.9 (22 Jul 2018 18:15)  HR: 90 (23 Jul 2018 05:19) (89 - 96)  BP: 159/80 (23 Jul 2018 05:19) (141/69 - 176/104)  BP(mean): --  RR: 18 (23 Jul 2018 05:19) (18 - 22)  SpO2: 93% (23 Jul 2018 05:19) (92% - 98%)    Physical Exam:    General: [ ] Alert,  A&O x     [ x] lethargic   [ ] Agitated   [ ] Cachexia   HEENT: [ ] Normal   [x ] Dry mouth   [ ] ET Tube    [ ] Trach   Lungs: [ ] Clear [x ] Rhonchi  [ ] Crackles [ ] Wheezing [ ] Tachypnea  [ ] Audible excessive secretions    Cardiovascular:  [ ] Regular rate and rhythm  [x ] Irregular [x ] Tachycardia   [ ] Bradycardia   Abdomen: [x ] Soft  [ ] Distended  [ x] +BS  [x ] Non tender [ ] Tender  [ ]PEG   [ ] NGT   Last BM:     Genitourinary: [ ] Normal [x ] Incontinent   [ ] Oliguria/Anuria   [ ] Ford  Musculoskeletal:  [ ] Normal   [ ] Generalized weakness  [x ] Bedbound  [ ] Edema   Neurological: [ ] No focal deficits  [x ] Cognitive impairment     Skin: [ ] Normal   [ ] Pressure ulcers     LABS:    I&O's Summary    22 Jul 2018 07:01  -  23 Jul 2018 07:00  --------------------------------------------------------  IN: 350 mL / OUT: 1100 mL / NET: -750 mL    RADIOLOGY & ADDITIONAL STUDIES:    Family meeting on: DATE: 7/23/18 Met and spoke to Oksana Kessler (daughter) and Homar Onofre (son) and discussed goals of care and advance care planning. MOLST Form is completed on 7/21 18, DNR/DNI, limited interventions, no tube feeding. Hospice care explained and accepted. Hospice evaluation called in, and Social work made aware to send in all script for Hospice evaluation  RECOMMENDATIONS:  DNR/ DNI, COMFORT CARE, PAIN AND SYMPTOM MANAGEMENT WITH HOSPICE CARE.  Referrals:  Hospice [ X]   Chaplaincy [X ]    Child Life [ ]   Social Work [X ]   Case Management [ ]   Holistic Therapy [ ]

## 2018-07-23 NOTE — GOALS OF CARE CONVERSATION - PERSONAL ADVANCE DIRECTIVE - CONVERSATION DETAILS
Spoke with Dr. Mac. Informed that pt is a [facility] pt. Pt's dtr agrees for pt to transfer to In pt HSP at [facility] and medical management will be cont by Dr. Brown. Dr. Mac is requesting Mathew WALKER to complete d/c to In pt [facility]. House PA paged. Awaiting call back.       Gracia Daigle RN

## 2018-07-23 NOTE — DISCHARGE NOTE ADULT - CARE PLAN
Principal Discharge DX:	Cerebrovascular accident (CVA), unspecified mechanism  Goal:	comfort care  Assessment and plan of treatment:	Admit to inpt. hospice unit  Secondary Diagnosis:	Essential hypertension  Assessment and plan of treatment:	cont home meds  Secondary Diagnosis:	High cholesterol  Assessment and plan of treatment:	cont home meds

## 2018-07-23 NOTE — DISCHARGE NOTE ADULT - MEDICATION SUMMARY - MEDICATIONS TO TAKE
I will START or STAY ON the medications listed below when I get home from the hospital:    Aspir 81 oral delayed release tablet  -- 1 tab(s) by mouth once a day  -- Indication: For Cardiac protection    acetaminophen 325 mg rectal suppository  -- 1 suppository(ies) rectally 4 times a day, As needed, Mild Pain (1 - 3)  -- Indication: For pain    morphine  -- 2 milligram(s) intravenous every 6 hours, As Needed  -- Indication: For pain    cloNIDine 0.1 mg/24 hr transdermal film, extended release  -- 0.1 milligram(s) by transdermal patch once a week  -- Indication: For HTN (hypertension)    Inderal  -- 80  by mouth once a day  -- Indication: For antiarrhythmic    rosuvastatin 20 mg oral tablet  -- 1 tab(s) by mouth once a day (at bedtime)  -- Indication: For Hld    Lasix 20 mg oral tablet  -- orally 3 times a week  -- Indication: For HTN (hypertension)    Dextrose 5% with 0.9% NaCl intravenous solution  -- 1000 milliliter(s) intravenous   -- Indication: For Hydration

## 2018-07-26 LAB
CULTURE RESULTS: SIGNIFICANT CHANGE UP
CULTURE RESULTS: SIGNIFICANT CHANGE UP
SPECIMEN SOURCE: SIGNIFICANT CHANGE UP
SPECIMEN SOURCE: SIGNIFICANT CHANGE UP

## 2018-07-27 DIAGNOSIS — I63.9 CEREBRAL INFARCTION, UNSPECIFIED: ICD-10-CM

## 2018-07-27 DIAGNOSIS — E78.00 PURE HYPERCHOLESTEROLEMIA, UNSPECIFIED: ICD-10-CM

## 2018-07-27 DIAGNOSIS — R40.20 UNSPECIFIED COMA: ICD-10-CM

## 2018-07-27 DIAGNOSIS — Z79.82 LONG TERM (CURRENT) USE OF ASPIRIN: ICD-10-CM

## 2018-07-27 DIAGNOSIS — R29.717 NIHSS SCORE 17: ICD-10-CM

## 2018-07-27 DIAGNOSIS — I48.91 UNSPECIFIED ATRIAL FIBRILLATION: ICD-10-CM

## 2018-07-27 DIAGNOSIS — I11.0 HYPERTENSIVE HEART DISEASE WITH HEART FAILURE: ICD-10-CM

## 2018-07-27 DIAGNOSIS — I50.9 HEART FAILURE, UNSPECIFIED: ICD-10-CM

## 2018-07-27 DIAGNOSIS — I63.312 CEREBRAL INFARCTION DUE TO THROMBOSIS OF LEFT MIDDLE CEREBRAL ARTERY: ICD-10-CM

## 2018-07-27 DIAGNOSIS — Z51.5 ENCOUNTER FOR PALLIATIVE CARE: ICD-10-CM

## 2024-08-18 NOTE — CONSULT NOTE ADULT - CONSULT REQUESTED DATE/TIME
Chronic headache worse today with associated vision changes.   Migraine vs withdrawal       23-Jul-2018